# Patient Record
(demographics unavailable — no encounter records)

---

## 2024-11-02 NOTE — ASSESSMENT
[FreeTextEntry1] : 69M DM2, EMMANUEL who p/w new dysphagia.  #Dysphagia - new onset since last EGD and findings of previous EGDs unknown - ddx includes HH, esophagitis, stricture/stenosis, motility disorder, mass lesion; merits luminal evaluation, will order EGD and plan for esophageal biopsies - discussed that if luminal eval is unrevealing, next steps in evaluation will be TBE +/- HRM - requested that he have Dr. Max send records to us

## 2024-11-02 NOTE — HISTORY OF PRESENT ILLNESS
[FreeTextEntry1] : Mr. Evelio Thompson is a 68 yo man with PMHx EMMANUEL on CPAP, asthma, HLD, DM2, [employer] exposure (first 6 months - worked in [place] [employer]) who presents to discuss new-onset dysphagia.  Feels food getting stuck in his chest, started in June, localized to sternal notch, happens with liquids and solids. Happens 1-2 times/week, some weeks he does not have it, sometimes multiple times a day, does not need to regurgitate, tries to change position to make things go down, trying to wash things down doesn't usually work, will last about 20 minutes before it eventually goes down, when it happens he will continue eating slowly or stop, no cough/choking, +globus sensation.  Has lost 20 lbs over the last year but believes this is 2/2 lifestyle changes he has made to better manage his DM2. No abdominal pain, no nausea or vomiting, no bloody or black stools. Years ago had heartburn and regurgitation, none recently.   1/2023 had EGD/colonoscopy with Dr. Max, does not know why he had the EGD or the results. Believes he has had other upper endoscopies in the past, he is not sure why. Does not recall ever being told he had esophagitis or BE.  No family history of colon cancer, gastric cancer, esophageal cancer. No personal history of smoking. Denies NSAIDs (allergic).  Meds: Dulera for asthma Rosuvastatin Metformin Dexamethasone nasal spray PRN

## 2024-11-02 NOTE — HISTORY OF PRESENT ILLNESS
[FreeTextEntry1] : Mr. Evelio Thompson is a 70 yo man with PMHx EMMANUEL on CPAP, asthma, HLD, DM2, [employer] exposure (first 6 months - [place] [employer]) who presents to discuss new-onset dysphagia.  Feels food getting stuck in his chest, started in June, localized to sternal notch, happens with liquids and solids. Happens 1-2 times/week, some weeks he does not have it, sometimes multiple times a day, does not need to regurgitate, tries to change position to make things go down, trying to wash things down doesn't usually work, will last about 20 minutes before it eventually goes down, when it happens he will continue eating slowly or stop, no cough/choking, +globus sensation.  Has lost 20 lbs over the last year but believes this is 2/2 lifestyle changes he has made to better manage his DM2. No abdominal pain, no nausea or vomiting, no bloody or black stools. Years ago had heartburn and regurgitation, none recently.   1/2023 had EGD/colonoscopy with Dr. Max, does not know why he had the EGD or the results. Believes he has had other upper endoscopies in the past, he is not sure why. Does not recall ever being told he had esophagitis or BE.  No family history of colon cancer, gastric cancer, esophageal cancer. No personal history of smoking. Denies NSAIDs (allergic).  Meds: Dulera for asthma Rosuvastatin Metformin Dexamethasone nasal spray PRN

## 2024-12-10 NOTE — PROCEDURE
[Other:____] : ~M [unfilled] [Right Foot] : was performed on the right foot [Therapeutic] : therapeutic [Patient] : the patient [Risks] : risks [Benefits] : benefits [Alternatives] : alternatives [1%] : 1%  [Alcohol] : alcohol [25 gauge] : A 25 gauge needle was used [Kenalog 40] : Kenalog 40 [Tolerated Well] : tolerated the procedure well [No Complications] : There were no complications. [Instructions Given] : given handouts/patient instructions [Patient Instructed to Call] : instructed to call if redness at site, a decrease in range of motion or an increase in pain is noted after procedure.

## 2024-12-12 NOTE — PHYSICAL EXAM
[2+] : left foot dorsalis pedis 2+ [Skin Color & Pigmentation] : normal skin color and pigmentation [Skin Turgor] : normal skin turgor [Skin Lesions] : no skin lesions [Sensation] : the sensory exam was normal to light touch and pinprick [No Focal Deficits] : no focal deficits [Deep Tendon Reflexes (DTR)] : deep tendon reflexes were 2+ and symmetric [Motor Exam] : the motor exam was normal [General Appearance - Alert] : alert [Oriented To Time, Place, And Person] : oriented to person, place, and time [Ankle Swelling (On Exam)] : not present [Varicose Veins Of Lower Extremities] : not present [] : not present [Delayed in the Right Toes] : capillary refills normal in right toes [Delayed in the Left Toes] : capillary refills normal in the left toes [de-identified] : Pain, directly in the sinus tarsi.  Pain on palpation.  Pain with attempted ROM.  No pain within the ankle itself.   [Foot Ulcer] : no foot ulcer [Skin Induration] : no skin induration

## 2024-12-12 NOTE — PHYSICAL EXAM
[2+] : left foot dorsalis pedis 2+ [Skin Color & Pigmentation] : normal skin color and pigmentation [Skin Turgor] : normal skin turgor [Skin Lesions] : no skin lesions [Sensation] : the sensory exam was normal to light touch and pinprick [No Focal Deficits] : no focal deficits [Deep Tendon Reflexes (DTR)] : deep tendon reflexes were 2+ and symmetric [Motor Exam] : the motor exam was normal [General Appearance - Alert] : alert [Oriented To Time, Place, And Person] : oriented to person, place, and time [Ankle Swelling (On Exam)] : not present [Varicose Veins Of Lower Extremities] : not present [] : not present [Delayed in the Right Toes] : capillary refills normal in right toes [Delayed in the Left Toes] : capillary refills normal in the left toes [de-identified] : Pain, directly in the sinus tarsi.  Pain on palpation.  Pain with attempted ROM.  No pain within the ankle itself.   [Foot Ulcer] : no foot ulcer [Skin Induration] : no skin induration

## 2024-12-12 NOTE — ASSESSMENT
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  Arthritis of the right foot (M19.071).  Diabetes (E11.9).  Recommendations: Attempt another injection into the subtalar joint.  After discussing all risks, benefits and alternatives, patient consented.  Treatment: Patient was injected with a combination of 1% Xylocaine, plain, 0.5% Marcaine, plain and Kenalog-40, a total of 3cc's into the subtalar joint, right.  Patient was given home care instructions.  Any questions or problems, patient is to contact the office.

## 2024-12-12 NOTE — HISTORY OF PRESENT ILLNESS
[FreeTextEntry1] : Patient presents today for reevaluation of right heel pain, right sinus tarsi pain.  He had been asymptomatic for some time but now it seems to have returned.  He does have some arthritis in his ankle as well, but the pain is primarily at the subtalar joint.  No new changes to his medical status.

## 2024-12-12 NOTE — PHYSICAL EXAM
[2+] : left foot dorsalis pedis 2+ [Skin Color & Pigmentation] : normal skin color and pigmentation [Skin Turgor] : normal skin turgor [Skin Lesions] : no skin lesions [Sensation] : the sensory exam was normal to light touch and pinprick [No Focal Deficits] : no focal deficits [Deep Tendon Reflexes (DTR)] : deep tendon reflexes were 2+ and symmetric [Motor Exam] : the motor exam was normal [General Appearance - Alert] : alert [Oriented To Time, Place, And Person] : oriented to person, place, and time [Ankle Swelling (On Exam)] : not present [Varicose Veins Of Lower Extremities] : not present [] : not present [Delayed in the Right Toes] : capillary refills normal in right toes [Delayed in the Left Toes] : capillary refills normal in the left toes [de-identified] : Pain, directly in the sinus tarsi.  Pain on palpation.  Pain with attempted ROM.  No pain within the ankle itself.   [Foot Ulcer] : no foot ulcer [Skin Induration] : no skin induration

## 2025-07-14 NOTE — ASSESSMENT
[FreeTextEntry1] : 70M DM2, EMMANUEL who p/w dysphagia.  #Dysphagia - s/p unrevelaing EGD - discussed that next steps in evaluation of dysphagia include TBE, HRM +/- EGD with [facility] - will proceed with TBE and HRM

## 2025-07-14 NOTE — HISTORY OF PRESENT ILLNESS
[de-identified] : 05/13/2025 Impression:          - Normal hypopharynx.                      - Z-line regular, 41 cm from the incisors.                      - Esophagogastric landmarks identified.                      - Gastroesophageal flap valve classified as Hill Grade I                       (prominent fold, tight to endoscope).                      - Normal esophagus. Normal caliber esophageal lumen.                       Appearance of decreased esopahgeal contractions noted.                      - Erythematous mucosa in the prepyloric region of the                       stomach.                      - Normal duodenal bulb and second portion of the                       duodenum.                      - Biopsies performed in the lower third of the esophagus                       and in the upper third of the esophagus.                      - Biopsies performed in the gastric antrum and in the                       gastric body.  Final Diagnosis  1. Stomach antrum, biopsy:  - Chronic inactive gastritis.  No morphologic evidence of H. pylori.  Negative for intestinal metaplasia.  2. Stomach, body, biopsy :  - Chronic inactive gastritis.  No morphologic evidence of H. pylori.  Negative for intestinal metaplasia.  3. Distal esophagus, biopsy:  - Squamous mucosa with mild reflux related change.  No columnar mucosa identified.  No evidence of eosinophilic esophagitis.  4. Proximal esophagus:  - Squamous mucosa with no pathologic change.  No evidence of eosinophilic esophagitis.   [FreeTextEntry1] : Reportedly normal 2023